# Patient Record
Sex: FEMALE | Race: WHITE | URBAN - METROPOLITAN AREA
[De-identification: names, ages, dates, MRNs, and addresses within clinical notes are randomized per-mention and may not be internally consistent; named-entity substitution may affect disease eponyms.]

---

## 2017-05-05 ENCOUNTER — TELEPHONE (OUTPATIENT)
Dept: FAMILY MEDICINE | Facility: CLINIC | Age: 67
End: 2017-05-05

## 2017-05-05 NOTE — TELEPHONE ENCOUNTER
Call Regarding Preventive Health Screening Mammogram    Attempt 1    Message with male    Comments:       Outreach   Niyah Nickerson

## 2017-05-11 NOTE — TELEPHONE ENCOUNTER
Call Regarding Preventive Health Screening Mammogram    Attempt 2    Message on voicemail     Comments: manual dial      Outreach   Niyah Nickerson

## 2017-05-19 NOTE — TELEPHONE ENCOUNTER
5/19/2017    Call Regarding Preventive Health Screening Mammogram and Physical    Attempt 3    Message BUSY TONE, MANUAL DIAL MOBILE    Comments:       Outreach   KV

## 2019-05-20 ENCOUNTER — TRANSFERRED RECORDS (OUTPATIENT)
Dept: HEALTH INFORMATION MANAGEMENT | Facility: CLINIC | Age: 69
End: 2019-05-20

## 2019-05-29 ENCOUNTER — TRANSFERRED RECORDS (OUTPATIENT)
Dept: HEALTH INFORMATION MANAGEMENT | Facility: CLINIC | Age: 69
End: 2019-05-29